# Patient Record
Sex: MALE | Race: WHITE
[De-identification: names, ages, dates, MRNs, and addresses within clinical notes are randomized per-mention and may not be internally consistent; named-entity substitution may affect disease eponyms.]

---

## 2019-09-17 ENCOUNTER — HOSPITAL ENCOUNTER (INPATIENT)
Dept: HOSPITAL 61 - 4 NORTH | Age: 74
LOS: 2 days | Discharge: HOME | DRG: 392 | End: 2019-09-19
Attending: INTERNAL MEDICINE | Admitting: INTERNAL MEDICINE
Payer: MEDICARE

## 2019-09-17 VITALS — HEIGHT: 66 IN | BODY MASS INDEX: 25.71 KG/M2 | WEIGHT: 160 LBS

## 2019-09-17 VITALS — DIASTOLIC BLOOD PRESSURE: 52 MMHG | SYSTOLIC BLOOD PRESSURE: 94 MMHG

## 2019-09-17 VITALS — SYSTOLIC BLOOD PRESSURE: 106 MMHG | DIASTOLIC BLOOD PRESSURE: 64 MMHG

## 2019-09-17 DIAGNOSIS — Z95.810: ICD-10-CM

## 2019-09-17 DIAGNOSIS — Z82.5: ICD-10-CM

## 2019-09-17 DIAGNOSIS — K43.9: ICD-10-CM

## 2019-09-17 DIAGNOSIS — Z80.1: ICD-10-CM

## 2019-09-17 DIAGNOSIS — Z85.828: ICD-10-CM

## 2019-09-17 DIAGNOSIS — N40.0: ICD-10-CM

## 2019-09-17 DIAGNOSIS — I48.91: ICD-10-CM

## 2019-09-17 DIAGNOSIS — D13.7: ICD-10-CM

## 2019-09-17 DIAGNOSIS — I50.22: ICD-10-CM

## 2019-09-17 DIAGNOSIS — I25.5: ICD-10-CM

## 2019-09-17 DIAGNOSIS — I25.10: ICD-10-CM

## 2019-09-17 DIAGNOSIS — M19.90: ICD-10-CM

## 2019-09-17 DIAGNOSIS — E78.5: ICD-10-CM

## 2019-09-17 DIAGNOSIS — Z82.49: ICD-10-CM

## 2019-09-17 DIAGNOSIS — J98.11: ICD-10-CM

## 2019-09-17 DIAGNOSIS — K29.70: Primary | ICD-10-CM

## 2019-09-17 DIAGNOSIS — K21.9: ICD-10-CM

## 2019-09-17 DIAGNOSIS — Z95.5: ICD-10-CM

## 2019-09-17 DIAGNOSIS — D69.6: ICD-10-CM

## 2019-09-17 DIAGNOSIS — Z79.899: ICD-10-CM

## 2019-09-17 DIAGNOSIS — K76.0: ICD-10-CM

## 2019-09-17 DIAGNOSIS — I11.0: ICD-10-CM

## 2019-09-17 DIAGNOSIS — K42.9: ICD-10-CM

## 2019-09-17 LAB
ALBUMIN SERPL-MCNC: 3.7 G/DL (ref 3.4–5)
ALBUMIN/GLOB SERPL: 1.2 {RATIO} (ref 1–1.7)
ALP SERPL-CCNC: 65 U/L (ref 46–116)
ALT SERPL-CCNC: 22 U/L (ref 16–63)
AMYLASE SERPL-CCNC: 50 U/L (ref 25–115)
ANION GAP SERPL CALC-SCNC: 9 MMOL/L (ref 6–14)
AST SERPL-CCNC: 19 U/L (ref 15–37)
BASOPHILS # BLD AUTO: 0 X10^3/UL (ref 0–0.2)
BASOPHILS NFR BLD: 1 % (ref 0–3)
BILIRUB SERPL-MCNC: 0.7 MG/DL (ref 0.2–1)
BUN SERPL-MCNC: 17 MG/DL (ref 8–26)
BUN/CREAT SERPL: 15 (ref 6–20)
CALCIUM SERPL-MCNC: 8.7 MG/DL (ref 8.5–10.1)
CHLORIDE SERPL-SCNC: 108 MMOL/L (ref 98–107)
CO2 SERPL-SCNC: 25 MMOL/L (ref 21–32)
CREAT SERPL-MCNC: 1.1 MG/DL (ref 0.7–1.3)
EOSINOPHIL NFR BLD: 0.1 X10^3/UL (ref 0–0.7)
EOSINOPHIL NFR BLD: 2 % (ref 0–3)
ERYTHROCYTE [DISTWIDTH] IN BLOOD BY AUTOMATED COUNT: 12.4 % (ref 11.5–14.5)
GFR SERPLBLD BASED ON 1.73 SQ M-ARVRAT: 65.4 ML/MIN
GLOBULIN SER-MCNC: 3 G/DL (ref 2.2–3.8)
GLUCOSE SERPL-MCNC: 84 MG/DL (ref 70–99)
HCT VFR BLD CALC: 43.4 % (ref 39–53)
HGB BLD-MCNC: 14.9 G/DL (ref 13–17.5)
LIPASE: 140 U/L (ref 73–393)
LYMPHOCYTES # BLD: 1.1 X10^3/UL (ref 1–4.8)
LYMPHOCYTES NFR BLD AUTO: 23 % (ref 24–48)
MCH RBC QN AUTO: 32 PG (ref 25–35)
MCHC RBC AUTO-ENTMCNC: 34 G/DL (ref 31–37)
MCV RBC AUTO: 94 FL (ref 79–100)
MONO #: 0.5 X10^3/UL (ref 0–1.1)
MONOCYTES NFR BLD: 10 % (ref 0–9)
NEUT #: 3 X10^3/UL (ref 1.8–7.7)
NEUTROPHILS NFR BLD AUTO: 64 % (ref 31–73)
PLATELET # BLD AUTO: 137 X10^3/UL (ref 140–400)
POTASSIUM SERPL-SCNC: 3.9 MMOL/L (ref 3.5–5.1)
PROT SERPL-MCNC: 6.7 G/DL (ref 6.4–8.2)
RBC # BLD AUTO: 4.62 X10^6/UL (ref 4.3–5.7)
SODIUM SERPL-SCNC: 142 MMOL/L (ref 136–145)
WBC # BLD AUTO: 4.8 X10^3/UL (ref 4–11)

## 2019-09-17 PROCEDURE — 85025 COMPLETE CBC W/AUTO DIFF WBC: CPT

## 2019-09-17 PROCEDURE — 76700 US EXAM ABDOM COMPLETE: CPT

## 2019-09-17 PROCEDURE — A9537 TC99M MEBROFENIN: HCPCS

## 2019-09-17 PROCEDURE — 83690 ASSAY OF LIPASE: CPT

## 2019-09-17 PROCEDURE — 78227 HEPATOBIL SYST IMAGE W/DRUG: CPT

## 2019-09-17 PROCEDURE — 93306 TTE W/DOPPLER COMPLETE: CPT

## 2019-09-17 PROCEDURE — 80053 COMPREHEN METABOLIC PANEL: CPT

## 2019-09-17 PROCEDURE — 82150 ASSAY OF AMYLASE: CPT

## 2019-09-17 PROCEDURE — 71046 X-RAY EXAM CHEST 2 VIEWS: CPT

## 2019-09-17 PROCEDURE — 93005 ELECTROCARDIOGRAM TRACING: CPT

## 2019-09-17 PROCEDURE — 36415 COLL VENOUS BLD VENIPUNCTURE: CPT

## 2019-09-17 PROCEDURE — G0378 HOSPITAL OBSERVATION PER HR: HCPCS

## 2019-09-17 PROCEDURE — 80048 BASIC METABOLIC PNL TOTAL CA: CPT

## 2019-09-17 PROCEDURE — 74177 CT ABD & PELVIS W/CONTRAST: CPT

## 2019-09-17 RX ADMIN — ATORVASTATIN CALCIUM SCH MG: 20 TABLET, FILM COATED ORAL at 22:27

## 2019-09-17 RX ADMIN — SACUBITRIL AND VALSARTAN SCH TAB: 49; 51 TABLET, FILM COATED ORAL at 22:27

## 2019-09-17 NOTE — RAD
CT abdomen and pelvis with contrast

 

PQRS statement: CT scans at this facility use dose reduction including 

either automated exposure control, iterative reconstructions, and /or 

weight based radiation dosing via mA and kV modification when appropriate 

to reduce radiation dose to as low as reasonably achievable.

 

HISTORY: Abdominal pain.

 

TECHNIQUE: Helical CT imaging abdomen and pelvis with 75 mL Omnipaque 300 

venous contrast.

 

Abdomen findings: Lung bases unremarkable. Cardiomegaly. Grade 1 L4 

anterolisthesis, lower lumbar disc disease and arthritic changes spinal 

canal and neural foraminal stenoses. At the pancreas uncinate is a 1 cm 

hypervascular lesion. Liver, gallbladder, adrenal glands, spleen and right

kidney are unremarkable. Left renal lower pole parapelvic cysts. There is 

mild fold thickening of the third and fourth segments of the duodenum and 

of the proximal jejunum at the left upper quadrant abdomen. No bowel 

obstruction. Appendix is negative. Sigmoid diverticulosis. 3 center fatty 

umbilical abdominal wall hernia. No abdominal fluid or adenopathy.

 

Pelvis findings: Bladder, prostate, rectum and bones are unremarkable. No 

fluid or adenopathy.

 

IMPRESSION:

1. Mild fold thickening of the duodenum and proximal jejunum likely 

representing enteritis. No bowel obstruction.

2. The appendix is negative.

3. 1 cm hypervascular lesion of the pancreas uncinate. This raises the 

possibility of an islet cell tumor of the pancreas. An aneurysm within the

pancreas would be a secondary consideration.

4. 3 center fatty umbilical abdominal wall hernia.

 

Electronically signed by: Richard Nelson MD (9/17/2019 11:44 PM) 

[id number]

## 2019-09-18 VITALS — DIASTOLIC BLOOD PRESSURE: 69 MMHG | SYSTOLIC BLOOD PRESSURE: 113 MMHG

## 2019-09-18 VITALS — SYSTOLIC BLOOD PRESSURE: 92 MMHG | DIASTOLIC BLOOD PRESSURE: 57 MMHG

## 2019-09-18 VITALS — SYSTOLIC BLOOD PRESSURE: 105 MMHG | DIASTOLIC BLOOD PRESSURE: 68 MMHG

## 2019-09-18 VITALS — DIASTOLIC BLOOD PRESSURE: 61 MMHG | SYSTOLIC BLOOD PRESSURE: 100 MMHG

## 2019-09-18 VITALS — DIASTOLIC BLOOD PRESSURE: 52 MMHG | SYSTOLIC BLOOD PRESSURE: 86 MMHG

## 2019-09-18 VITALS — DIASTOLIC BLOOD PRESSURE: 66 MMHG | SYSTOLIC BLOOD PRESSURE: 109 MMHG

## 2019-09-18 LAB
ANION GAP SERPL CALC-SCNC: 9 MMOL/L (ref 6–14)
BASOPHILS # BLD AUTO: 0 X10^3/UL (ref 0–0.2)
BASOPHILS NFR BLD: 1 % (ref 0–3)
BUN SERPL-MCNC: 14 MG/DL (ref 8–26)
CALCIUM SERPL-MCNC: 8.7 MG/DL (ref 8.5–10.1)
CHLORIDE SERPL-SCNC: 109 MMOL/L (ref 98–107)
CO2 SERPL-SCNC: 25 MMOL/L (ref 21–32)
CREAT SERPL-MCNC: 1.1 MG/DL (ref 0.7–1.3)
EOSINOPHIL NFR BLD: 0.1 X10^3/UL (ref 0–0.7)
EOSINOPHIL NFR BLD: 2 % (ref 0–3)
ERYTHROCYTE [DISTWIDTH] IN BLOOD BY AUTOMATED COUNT: 12.7 % (ref 11.5–14.5)
GFR SERPLBLD BASED ON 1.73 SQ M-ARVRAT: 65.4 ML/MIN
GLUCOSE SERPL-MCNC: 95 MG/DL (ref 70–99)
HCT VFR BLD CALC: 42.4 % (ref 39–53)
HGB BLD-MCNC: 14.4 G/DL (ref 13–17.5)
LYMPHOCYTES # BLD: 0.9 X10^3/UL (ref 1–4.8)
LYMPHOCYTES NFR BLD AUTO: 20 % (ref 24–48)
MCH RBC QN AUTO: 32 PG (ref 25–35)
MCHC RBC AUTO-ENTMCNC: 34 G/DL (ref 31–37)
MCV RBC AUTO: 94 FL (ref 79–100)
MONO #: 0.5 X10^3/UL (ref 0–1.1)
MONOCYTES NFR BLD: 11 % (ref 0–9)
NEUT #: 2.8 X10^3/UL (ref 1.8–7.7)
NEUTROPHILS NFR BLD AUTO: 67 % (ref 31–73)
PLATELET # BLD AUTO: 138 X10^3/UL (ref 140–400)
POTASSIUM SERPL-SCNC: 3.9 MMOL/L (ref 3.5–5.1)
RBC # BLD AUTO: 4.49 X10^6/UL (ref 4.3–5.7)
SODIUM SERPL-SCNC: 143 MMOL/L (ref 136–145)
WBC # BLD AUTO: 4.2 X10^3/UL (ref 4–11)

## 2019-09-18 PROCEDURE — 4B02XSZ MEASUREMENT OF CARDIAC PACEMAKER, EXTERNAL APPROACH: ICD-10-PCS | Performed by: PHYSICIAN ASSISTANT

## 2019-09-18 RX ADMIN — TAMSULOSIN HYDROCHLORIDE SCH MG: 0.4 CAPSULE ORAL at 08:10

## 2019-09-18 RX ADMIN — ASPIRIN 81 MG SCH MG: 81 TABLET ORAL at 08:09

## 2019-09-18 RX ADMIN — METOPROLOL SUCCINATE SCH MG: 25 TABLET, EXTENDED RELEASE ORAL at 08:11

## 2019-09-18 RX ADMIN — SACUBITRIL AND VALSARTAN SCH TAB: 49; 51 TABLET, FILM COATED ORAL at 10:01

## 2019-09-18 RX ADMIN — PANTOPRAZOLE SODIUM SCH MG: 40 TABLET, DELAYED RELEASE ORAL at 08:08

## 2019-09-18 RX ADMIN — SACUBITRIL AND VALSARTAN SCH TAB: 49; 51 TABLET, FILM COATED ORAL at 21:17

## 2019-09-18 RX ADMIN — ATORVASTATIN CALCIUM SCH MG: 20 TABLET, FILM COATED ORAL at 21:17

## 2019-09-18 RX ADMIN — SPIRONOLACTONE SCH MG: 25 TABLET ORAL at 09:00

## 2019-09-18 NOTE — RAD
CHEST PA   LATERAL

 

History: Abdominal pain.

 

Comparison: October 30, 2019.

 

Findings:

Left transvenous pacemaker/ICD, unchanged. Left basilar subsegmental 

atelectasis. Unchanged heart size. No focal consolidation. No pleural 

effusion.

 

Impression: 

1.  Left basilar subsegmental atelectasis.

 

Electronically signed by: Negro Wolf DO (9/18/2019 9:59 AM) [id number]

## 2019-09-18 NOTE — PDOC2
CONSULT


Date of Consult


Date of Consult


DATE: 9/18/19 


TIME: 10:37





History of Present Illness


Reason for Visit:


The patient is a 74 year old male who was admitted after seeing his PCP, Dr Nova.  The patient has been experiencing abdominal pain over the last 3 

weeks.  The pain can migrate moving from the right to the mid abdomen.  The pain

is at times achy and dull, but can intensify.  He denies nausea or vomiting, but

does report some limited diarrhea.





Past Medical History


Past Medical History


enlarged prostate, MI, CAD, cardiomyopathy, hypertension





Past Surgical History


Past Surgical History


pacemaker, shoulder surgery, excision of skin lesion from shoulder





Social History


No


ALCOHOL:  occassional


Drugs:  None





Current Medications


Current Medications





Current Medications


Potassium Chloride/Dextrose/ Sod Cl 1,000 ml @  60 mls/hr [id number] IV  Last 

administered on 9/17/19at 19:47;  Start 9/17/19 at 18:15;  Stop 9/18/19 at 

10:28;  Status DC


Morphine Sulfate (Morphine Sulfate) 1 mg PRN Q4HRS  PRN IV PAIN;  Start 9/17/19 

at 18:15


Acetaminophen (Tylenol) 325 mg PRN Q6HRS  PRN PO MILD PAIN / TEMP;  Start 

9/17/19 at 18:15


Metronidazole 100 ml @  100 mls/hr Q8HRS IV  Last administered on 9/18/19at 

05:50;  Start 9/17/19 at 22:00;  Stop 9/18/19 at 07:19;  Status DC


Levofloxacin/ Dextrose 100 ml @  100 mls/hr Q24H IV  Last administered on 

9/17/19at 19:47;  Start 9/17/19 at 19:00;  Stop 9/18/19 at 07:19;  Status DC


Aspirin (Children'S Aspirin) 81 mg DAILY PO  Last administered on 9/18/19at 

08:15;  Start 9/18/19 at 09:00


Atorvastatin Calcium (Lipitor) 20 mg QHS PO  Last administered on 9/17/19at 

22:27;  Start 9/17/19 at 21:00


Metoprolol Succinate (Toprol Xl) 25 mg DAILY PO  Last administered on 9/18/19at 

08:15;  Start 9/18/19 at 09:00


Pantoprazole Sodium (Protonix) 40 mg DAILYAC PO  Last administered on 9/18/19at 

08:15;  Start 9/18/19 at 07:30


Sacubitril/ Valsartan (Entresto 49 Mg-51 Mg) 1 tab BID PO  Last administered on 

9/18/19at 10:01;  Start 9/17/19 at 21:00


Tamsulosin HCl (Flomax) 0.4 mg DAILY PO  Last administered on 9/18/19at 08:15;  

Start 9/18/19 at 09:00


Spironolactone (Aldactone) 25 mg DAILY PO ;  Start 9/18/19 at 09:00


Iohexol (Omnipaque 300 Mg/ml) 75 ml 1X  ONCE IV ;  Start 9/17/19 at 23:30;  Stop

9/17/19 at 23:31;  Status DC


Info (CONTRAST GIVEN -- Rx MONITORING) 1 each PRN DAILY  PRN [facility] SEE COMMENTS;  

Start 9/17/19 at 23:15;  Stop 9/19/19 at 23:14


Acetaminophen/ Hydrocodone Bitart (Lortab 5/325) 1 tab PRN Q4HRS  PRN PO 

MODERATE PAIN;  Start 9/18/19 at 10:30





Active Scripts


Active


Reported


Spironolactone 25 Mg Tablet 1 Tab PO DAILY


Entresto 49 mg-51 mg Tablet (Sacubitril/Valsartan) 1 Each Tablet 1 Each PO BID


NITROGLYCERIN SubLingual (Nitroglycerin) 0.4 Mg Tab.subl 1 Tab SL UD


Pantoprazole Sodium  ** (Pantoprazole Sodium) 40 Mg Tablet.dr 40 Mg PO DAILY


Toprol Xl (Metoprolol Succinate) 25 Mg Tab.er.24h 25 Mg PO DAILY


Flomax (Tamsulosin Hcl) 0.4 Mg Cap.er.24h 0.4 Mg PO DAILY


Clopidogrel (Clopidogrel Bisulfate) 75 Mg Tablet 75 Mg PO DAILY


Aspirin 81 Mg Tab.chew 81 Mg PO DAILY


Atorvastatin Calcium 40 Mg Tablet 40 Mg PO HS


Losartan Potassium ** (Losartan Potassium) 25 Mg Tablet 25 Mg PO DAILY





Allergies


Allergies:  


Coded Allergies:  


     ramipril (Verified  Allergy, Intermediate, 9/18/19)


   


   cough


     thimerosal (Verified  Allergy, Intermediate, 8/17/15)


   


   reddened eyes





ROS


General:  No: Chills, Night Sweats, Fatigue, Malaise, Appetite, Other


PSYCHOLOGICAL ROS:  No: Anxiety, Behavioral Disorder, Concentration difficultie,

Decreased libido, Depression, Disorientation, Hallucinations, Hostility, 

Irritablity, Memory difficulties, Mood Swings, Obsessive thoughts, Physical 

abuse, Sexual abuse, Sleep disturbances, Suicidal ideation, Other


Eyes:  No Blurry vision, No Decreased vision, No Double vision, No Dry eyes, No 

Excessive tearing, No Eye Pain, No Itchy Eyes, No Loss of vision, No 

Photophobia, No Scotomata, No Uses contacts, No Uses glasses, No Other


HEENT:  No: Heacaches, Visual Changes, Hearing change, Nasal congestion, Nasal 

discharge, Oral lesions, Sinus pain, Sore Throat, Epistaxis, Sneezing, Snoring, 

Tinnitus, Vertigo, Vocal changes, Other


ALLERGY AND IMMUNOLOGY:  No: Hives, Insect Bite Sensitivity, Itchy/Watery Eyes, 

Nasal Congestion, Post Nasal Drip, Seasonal Allergies, Other


Hematological and Lymphatic:  No: Bleeding Problems, Blood Clots, Blood 

Transfusions, Brusing, Night Sweats, Pallor, Swollen Lymph Nodes, Other


ENDOCRINE:  No: Breast Changes, Galactorrhea, Hair Pattern Changes, Hot Flashes,

Malaise/lethargy, Mood Swings, Palpitations, Polydipsia/polyuria, Skin Changes, 

Temperature Intolerance, Unexpected Weight Changes, Other


Cardiovascular:  No Chest Pain, No Palpitations, No Orthopnea, No Paroxysmal 

Noc. Dyspnea, No Edema, No Lt Headedness, No Other


Gastrointestinal:  Yes Abdominal Pain


Genitourinary:  No Dysuria, No Frequency, No Incontinence, No Hematuria, No 

Retention, No Discharge, No Urgency, No Pain, No Flank Pain, No Other, No , No ,

No , No , No , No , No 


Musculoskeletal:  No Gait Disturbance, No Joint Pain, No Joint Stiffness, No 

Joint Swelling, No Muscle Pain, No Muscular Weakness, No Pain In:, No Swelling 

In:, No Other


Neurological:  No Behavorial Changes, No Bowel/Bladder ControlChng, No 

Confusion, No Dizziness, No Gait Disturbance, No Headaches, No Impaired 

Coord/balance, No Memory Loss, No Numbness/Tingling, No Seizures, No Speech 

Problems, No Tremors, No Visual Changes, No Weakness, No Other


Skin:  No Dry Skin, No Eczema, No Hair Changes, No Lumps, No Mole Changes, No 

Mottling, No Nail Changes, No Pruritus, No Rash, No Skin Lesion Changes, No 

Other, No Acne





Physical Exam


General:  Alert, Oriented X3, Cooperative


HEENT:  Atraumatic


Lungs:  Clear to auscultation


Heart:  Regular rate


Abdomen:  Soft


Extremities:  No clubbing (mildly tender just right of midline in upper abdomen,

small reducible umbilical hernia)


Skin:  No rashes, No breakdown


Neuro:  Normal speech


Psych/Mental Status:  Mental status NL


MUSCULOSKELETAL:  No joint tenderness, No deformity





Vitals


VITALS





Vital Signs








  Date Time  Temp Pulse Resp B/P (MAP) Pulse Ox O2 Delivery O2 Flow Rate FiO2


 


9/18/19 10:01  74  100/61    


 


9/18/19 07:00 99.0  14  98 Room Air  





 99.0       











Labs


Labs





Laboratory Tests








Test


 9/17/19


19:15 9/18/19


05:40


 


White Blood Count


 4.8 x10^3/uL


(4.0-11.0) 4.2 x10^3/uL


(4.0-11.0)


 


Red Blood Count


 4.62 x10^6/uL


(4.30-5.70) 4.49 x10^6/uL


(4.30-5.70)


 


Hemoglobin


 14.9 g/dL


(13.0-17.5) 14.4 g/dL


(13.0-17.5)


 


Hematocrit


 43.4 %


(39.0-53.0) 42.4 %


(39.0-53.0)


 


Mean Corpuscular Volume 94 fL ()  94 fL () 


 


Mean Corpuscular Hemoglobin 32 pg (25-35)  32 pg (25-35) 


 


Mean Corpuscular Hemoglobin


Concent 34 g/dL


(31-37) 34 g/dL


(31-37)


 


Red Cell Distribution Width


 12.4 %


(11.5-14.5) 12.7 %


(11.5-14.5)


 


Platelet Count


 137 x10^3/uL


(140-400) 138 x10^3/uL


(140-400)


 


Neutrophils (%) (Auto) 64 % (31-73)  67 % (31-73) 


 


Lymphocytes (%) (Auto) 23 % (24-48)  20 % (24-48) 


 


Monocytes (%) (Auto) 10 % (0-9)  11 % (0-9) 


 


Eosinophils (%) (Auto) 2 % (0-3)  2 % (0-3) 


 


Basophils (%) (Auto) 1 % (0-3)  1 % (0-3) 


 


Neutrophils # (Auto)


 3.0 x10^3/uL


(1.8-7.7) 2.8 x10^3/uL


(1.8-7.7)


 


Lymphocytes # (Auto)


 1.1 x10^3/uL


(1.0-4.8) 0.9 x10^3/uL


(1.0-4.8)


 


Monocytes # (Auto)


 0.5 x10^3/uL


(0.0-1.1) 0.5 x10^3/uL


(0.0-1.1)


 


Eosinophils # (Auto)


 0.1 x10^3/uL


(0.0-0.7) 0.1 x10^3/uL


(0.0-0.7)


 


Basophils # (Auto)


 0.0 x10^3/uL


(0.0-0.2) 0.0 x10^3/uL


(0.0-0.2)


 


Sodium Level


 142 mmol/L


(136-145) 143 mmol/L


(136-145)


 


Potassium Level


 3.9 mmol/L


(3.5-5.1) 3.9 mmol/L


(3.5-5.1)


 


Chloride Level


 108 mmol/L


() 109 mmol/L


()


 


Carbon Dioxide Level


 25 mmol/L


(21-32) 25 mmol/L


(21-32)


 


Anion Gap 9 (6-14)  9 (6-14) 


 


Blood Urea Nitrogen


 17 mg/dL


(8-26) 14 mg/dL


(8-26)


 


Creatinine


 1.1 mg/dL


(0.7-1.3) 1.1 mg/dL


(0.7-1.3)


 


Estimated GFR


(Cockcroft-Gault) 65.4 


 65.4 





 


BUN/Creatinine Ratio 15 (6-20)  


 


Glucose Level


 84 mg/dL


(70-99) 95 mg/dL


(70-99)


 


Calcium Level


 8.7 mg/dL


(8.5-10.1) 8.7 mg/dL


(8.5-10.1)


 


Total Bilirubin


 0.7 mg/dL


(0.2-1.0) 





 


Aspartate Amino Transf


(AST/SGOT) 19 U/L (15-37) 


 





 


Alanine Aminotransferase


(ALT/SGPT) 22 U/L (16-63) 


 





 


Alkaline Phosphatase


 65 U/L


() 





 


Total Protein


 6.7 g/dL


(6.4-8.2) 





 


Albumin


 3.7 g/dL


(3.4-5.0) 





 


Albumin/Globulin Ratio 1.2 (1.0-1.7)  


 


Amylase Level


 50 U/L


() 





 


Lipase


 140 U/L


() 











Laboratory Tests








Test


 9/17/19


19:15 9/18/19


05:40


 


White Blood Count


 4.8 x10^3/uL


(4.0-11.0) 4.2 x10^3/uL


(4.0-11.0)


 


Red Blood Count


 4.62 x10^6/uL


(4.30-5.70) 4.49 x10^6/uL


(4.30-5.70)


 


Hemoglobin


 14.9 g/dL


(13.0-17.5) 14.4 g/dL


(13.0-17.5)


 


Hematocrit


 43.4 %


(39.0-53.0) 42.4 %


(39.0-53.0)


 


Mean Corpuscular Volume 94 fL ()  94 fL () 


 


Mean Corpuscular Hemoglobin 32 pg (25-35)  32 pg (25-35) 


 


Mean Corpuscular Hemoglobin


Concent 34 g/dL


(31-37) 34 g/dL


(31-37)


 


Red Cell Distribution Width


 12.4 %


(11.5-14.5) 12.7 %


(11.5-14.5)


 


Platelet Count


 137 x10^3/uL


(140-400) 138 x10^3/uL


(140-400)


 


Neutrophils (%) (Auto) 64 % (31-73)  67 % (31-73) 


 


Lymphocytes (%) (Auto) 23 % (24-48)  20 % (24-48) 


 


Monocytes (%) (Auto) 10 % (0-9)  11 % (0-9) 


 


Eosinophils (%) (Auto) 2 % (0-3)  2 % (0-3) 


 


Basophils (%) (Auto) 1 % (0-3)  1 % (0-3) 


 


Neutrophils # (Auto)


 3.0 x10^3/uL


(1.8-7.7) 2.8 x10^3/uL


(1.8-7.7)


 


Lymphocytes # (Auto)


 1.1 x10^3/uL


(1.0-4.8) 0.9 x10^3/uL


(1.0-4.8)


 


Monocytes # (Auto)


 0.5 x10^3/uL


(0.0-1.1) 0.5 x10^3/uL


(0.0-1.1)


 


Eosinophils # (Auto)


 0.1 x10^3/uL


(0.0-0.7) 0.1 x10^3/uL


(0.0-0.7)


 


Basophils # (Auto)


 0.0 x10^3/uL


(0.0-0.2) 0.0 x10^3/uL


(0.0-0.2)


 


Sodium Level


 142 mmol/L


(136-145) 143 mmol/L


(136-145)


 


Potassium Level


 3.9 mmol/L


(3.5-5.1) 3.9 mmol/L


(3.5-5.1)


 


Chloride Level


 108 mmol/L


() 109 mmol/L


()


 


Carbon Dioxide Level


 25 mmol/L


(21-32) 25 mmol/L


(21-32)


 


Anion Gap 9 (6-14)  9 (6-14) 


 


Blood Urea Nitrogen


 17 mg/dL


(8-26) 14 mg/dL


(8-26)


 


Creatinine


 1.1 mg/dL


(0.7-1.3) 1.1 mg/dL


(0.7-1.3)


 


Estimated GFR


(Cockcroft-Gault) 65.4 


 65.4 





 


BUN/Creatinine Ratio 15 (6-20)  


 


Glucose Level


 84 mg/dL


(70-99) 95 mg/dL


(70-99)


 


Calcium Level


 8.7 mg/dL


(8.5-10.1) 8.7 mg/dL


(8.5-10.1)


 


Total Bilirubin


 0.7 mg/dL


(0.2-1.0) 





 


Aspartate Amino Transf


(AST/SGOT) 19 U/L (15-37) 


 





 


Alanine Aminotransferase


(ALT/SGPT) 22 U/L (16-63) 


 





 


Alkaline Phosphatase


 65 U/L


() 





 


Total Protein


 6.7 g/dL


(6.4-8.2) 





 


Albumin


 3.7 g/dL


(3.4-5.0) 





 


Albumin/Globulin Ratio 1.2 (1.0-1.7)  


 


Amylase Level


 50 U/L


() 





 


Lipase


 140 U/L


() 














Images


Images


CT abdomen:





IMPRESSION:


1. Mild fold thickening of the duodenum and proximal jejunum likely 


representing enteritis. No bowel obstruction.


2. The appendix is negative.


3. 1 cm hypervascular lesion of the pancreas uncinate. This raises the 


possibility of an islet cell tumor of the pancreas. An aneurysm within the


pancreas would be a secondary consideration.


4. 3 center fatty umbilical abdominal wall hernia.





Assessment/Plan


Assessment/Plan


74 year old male with abdominal pain, CT findings noted;  suspect enteritis,  

small 1 cm pancreatic mass likely incidental.  No immediate surgical 

indications,  GI consulted and await their evaluation.  Consider a PIPIDA scan.











PARKER SUAREZ MD             Sep 18, 2019 10:42

## 2019-09-18 NOTE — PDOC2
NATE HONG APRN 9/18/19 1052:


CARDIAC CONSULT


DATE OF CONSULT


Date of Consult


DATE: 9/18/19 


TIME: 10:50





REASON FOR CONSULT


Reason for Consult:


hx of cardiomyopathy





REFERRING PHYSICIAN


Referring Physician:


Avtar





SOURCE


Source:  Chart review, Patient





HISTORY OF PRESENT ILLNESS


HISTORY OF PRESENT ILLNESS


This is a pleasant 75 yo male admitted for complains of abdominal pain.  Reports

that this just started this week.  His abd is tender especially when pressed. 

Reports no nause or vomiting and actually had a BM yesterday that was formed.  

Denies any chest pain, SOA.  Consult is for cardiomyopathy which is not new for 

him but his EF is now down from 35% to 20s.  No PND, orthopnea, palpitations, fr

equent dizziness, or passing out. No leg swelling.  He weighs himself everyday 

till his scale broke last week.  He follows his diet restriction and FR. His 

activity tolerance has not chnaged and no JADE nor exertional CP





PAST MEDICAL HISTORY


Cardiovascular:  AFIB, CAD, CHF, HTN, Hyperlipidemia, Other (Cardiomyopathy)


Pulmonary:  No pertinent hx


CENTRAL NERVOUS SYSTEM:  Other (no pertinent history)


GI:  GERD


Heme/Onc:  No pertinent hx


Hepatobiliary:  No pertinent hx


Psych:  No pertinent hx


Musculoskeletal:  Osteoarthritis


Rheumatologic:  No pertinent hx


Infectious disease:  No pertinent hx


ENT:  Other (cataract)


Renal/:  Benign prostatic enlarg.


Endocrine:  No pertinent hx


Dermatology:  Other (skin CA to back with removal)





PAST SURGICAL HISTORY


Past Surgical History:  Pacemaker (AICD), Arthroscopy (right shoulder), Other 

(PCI/stents remotely)





FAMILY HISTORY


Family History:  Coronary Artery Disease (father and brother)





SOCIAL HISTORY


Smoke:  Quit (remotely)


ALCOHOL:  none


Drugs:  None


Lives:  with Family





CURRENT MEDICATIONS


CURRENT MEDICATIONS





Current Medications








 Medications


  (Trade)  Dose


 Ordered  Sig/Clovis


 Route


 PRN Reason  Start Time


 Stop Time Status Last Admin


Dose Admin


 


 Potassium


 Chloride/Dextrose/


 Sod Cl  1,000 ml @ 


 60 mls/hr  [id number]


 IV


   9/17/19 18:15


 9/18/19 10:28 DC 9/17/19 19:47





 


 Metronidazole  100 ml @ 


 100 mls/hr  Q8HRS


 IV


   9/17/19 22:00


 9/18/19 07:19 DC 9/18/19 05:50





 


 Levofloxacin/


 Dextrose  100 ml @ 


 100 mls/hr  Q24H


 IV


   9/17/19 19:00


 9/18/19 07:19 DC 9/17/19 19:47





 


 Aspirin


  ([facility]


 Aspirin)  81 mg  DAILY


 PO


   9/18/19 09:00


    9/18/19 08:15





 


 Atorvastatin


 Calcium


  (Lipitor)  20 mg  QHS


 PO


   9/17/19 21:00


    9/17/19 22:27





 


 Metoprolol


 Succinate


  (Toprol Xl)  25 mg  DAILY


 PO


   9/18/19 09:00


    9/18/19 08:15





 


 Pantoprazole


 Sodium


  (Protonix)  40 mg  DAILYAC


 PO


   9/18/19 07:30


    9/18/19 08:15





 


 Sacubitril/


 Valsartan


  (Entresto 49


 Mg-51 Mg)  1 tab  BID


 PO


   9/17/19 21:00


    9/18/19 10:01





 


 Tamsulosin HCl


  (Flomax)  0.4 mg  DAILY


 PO


   9/18/19 09:00


    9/18/19 08:15














ALLERGIES


ALLERGIES:  


Coded Allergies:  


     ramipril (Verified  Allergy, Intermediate, 9/18/19)


   


   cough


     thimerosal (Verified  Allergy, Intermediate, 8/17/15)


   


   reddened eyes





ROS


Review of System


14 point ROS evaluated with pertinent positives noted per HPI





PHYSICAL EXAM


General:  Alert, Oriented X3, Cooperative, No acute distress


HEENT:  Atraumatic, Mucous membr. moist/pink


Lungs:  Other (diminished bases)


Heart:  Regular rate, Normal S1, Normal S2


Abdomen:  Other (distended with diffuse tenderness)


Extremities:  No cyanosis, No edema


Skin:  No breakdown, No significant lesion


Neuro:  Normal speech, Sensation intact


Psych/Mental Status:  Mental status NL, Mood NL


MUSCULOSKELETAL:  Osteoarthritic changes both hands





VITALS/I&O


VITALS/I&O:





                                   Vital Signs








  Date Time  Temp Pulse Resp B/P (MAP) Pulse Ox O2 Delivery O2 Flow Rate FiO2


 


9/18/19 10:01  74  100/61    


 


9/18/19 07:00 99.0  14  98 Room Air  





 99.0       











LABS


Lab:





                                Laboratory Tests








Test


 9/17/19


19:15 9/18/19


05:40


 


White Blood Count


 4.8 x10^3/uL


(4.0-11.0) 4.2 x10^3/uL


(4.0-11.0)


 


Red Blood Count


 4.62 x10^6/uL


(4.30-5.70) 4.49 x10^6/uL


(4.30-5.70)


 


Hemoglobin


 14.9 g/dL


(13.0-17.5) 14.4 g/dL


(13.0-17.5)


 


Hematocrit


 43.4 %


(39.0-53.0) 42.4 %


(39.0-53.0)


 


Mean Corpuscular Volume


 94 fL ()


 94 fL ()





 


Mean Corpuscular Hemoglobin 32 pg (25-35)   32 pg (25-35)  


 


Mean Corpuscular Hemoglobin


Concent 34 g/dL


(31-37) 34 g/dL


(31-37)


 


Red Cell Distribution Width


 12.4 %


(11.5-14.5) 12.7 %


(11.5-14.5)


 


Platelet Count


 137 x10^3/uL


(140-400)  L 138 x10^3/uL


(140-400)  L


 


Neutrophils (%) (Auto) 64 % (31-73)   67 % (31-73)  


 


Lymphocytes (%) (Auto) 23 % (24-48)  L 20 % (24-48)  L


 


Monocytes (%) (Auto) 10 % (0-9)  H 11 % (0-9)  H


 


Eosinophils (%) (Auto) 2 % (0-3)   2 % (0-3)  


 


Basophils (%) (Auto) 1 % (0-3)   1 % (0-3)  


 


Neutrophils # (Auto)


 3.0 x10^3/uL


(1.8-7.7) 2.8 x10^3/uL


(1.8-7.7)


 


Lymphocytes # (Auto)


 1.1 x10^3/uL


(1.0-4.8) 0.9 x10^3/uL


(1.0-4.8)  L


 


Monocytes # (Auto)


 0.5 x10^3/uL


(0.0-1.1) 0.5 x10^3/uL


(0.0-1.1)


 


Eosinophils # (Auto)


 0.1 x10^3/uL


(0.0-0.7) 0.1 x10^3/uL


(0.0-0.7)


 


Basophils # (Auto)


 0.0 x10^3/uL


(0.0-0.2) 0.0 x10^3/uL


(0.0-0.2)


 


Sodium Level


 142 mmol/L


(136-145) 143 mmol/L


(136-145)


 


Potassium Level


 3.9 mmol/L


(3.5-5.1) 3.9 mmol/L


(3.5-5.1)


 


Chloride Level


 108 mmol/L


()  H 109 mmol/L


()  H


 


Carbon Dioxide Level


 25 mmol/L


(21-32) 25 mmol/L


(21-32)


 


Anion Gap 9 (6-14)   9 (6-14)  


 


Blood Urea Nitrogen


 17 mg/dL


(8-26) 14 mg/dL


(8-26)


 


Creatinine


 1.1 mg/dL


(0.7-1.3) 1.1 mg/dL


(0.7-1.3)


 


Estimated GFR


(Cockcroft-Gault) 65.4  


 65.4  





 


BUN/Creatinine Ratio 15 (6-20)   


 


Glucose Level


 84 mg/dL


(70-99) 95 mg/dL


(70-99)


 


Calcium Level


 8.7 mg/dL


(8.5-10.1) 8.7 mg/dL


(8.5-10.1)


 


Total Bilirubin


 0.7 mg/dL


(0.2-1.0) 





 


Aspartate Amino Transferase


(AST) 19 U/L (15-37)


 





 


Alanine Aminotransferase (ALT)


 22 U/L (16-63)


 





 


Alkaline Phosphatase


 65 U/L


() 





 


Total Protein


 6.7 g/dL


(6.4-8.2) 





 


Albumin


 3.7 g/dL


(3.4-5.0) 





 


Albumin/Globulin Ratio 1.2 (1.0-1.7)   


 


Amylase Level


 50 U/L


() 





 


Lipase


 140 U/L


() 








                                Laboratory Tests


9/17/19 19:15








9/18/19 05:40








                                Laboratory Tests


9/17/19 19:15








9/18/19 05:40











IMAGES


IMAGES


IMPRESSION CT from [facility]





1. No bowel obstruction, ascites, or acute inflammatory process. The 


appendix is normal.


2. Cholelithiasis without gallbladder wall thickening or pericholecystic 


fluid. 


3. Mild diverticulosis coli.





By my electronic signature, I attest that I have personally reviewed the 


images for this examination and formulated the interpretations and 


opinions expressed in this report


1/2/2019 4:16 AM.





ECHOCARDIOGRAM


ECHOCARDIOGRAM





<Conclusion>


Left ventricle systolic function is severely impaired.


The Ejection Fraction is 20-25%.


Transmitral Doppler flow pattern is Grade I-abnormal relaxation pattern.


There is a pacemaker/ICD lead in the right atrium and right ventricle.


Trace mitral regurgitation.


Mild tricuspid regurgitation.


The PA pressure was estimated at 35 mmHg.


There is no evidence of significant pericardial effusion.





DATE:     09/18/19 0933





3/20/2017 from 











Left Ventricle Normal size and wall thickness. Severe LV Systolic Dysfunction 

with LVEF ~=25%. The anteroseptal and apical segments are akinetic with global 

hypokinesis otherwise. No LV mass or thrombus visualized. Probably normal LV 

Diastolic function. Reduced septal and lateral e' velocities noted, but other 

parameters are not clearly diagnostic of LV diastolic dysfunction.














Right Ventricle Normal size and wall thickness. Preserved RV systolic function. 

Pacemaker lead present in the ventricle.














Left Atrium Normal size.














Right Atrium Normal size.














IVC/SVC Normal central venous pressure (0-5 mm Hg).














Mitral Valve Normal valve structure. No stenosis. Trace regurgitation.














Tricuspid Valve Normal valve structure. No stenosis. Trace regurgitation.














Aortic Valve Normal valve structure. No stenosis. No regurgitation.














Pulmonary Normal valve structure. No stenosis. Trace regurgitation. 


The pulmonary artery was not well seen.














Aorta Normal aortic root and ascending aorta size. No Doppler evidence of aortic

coarctation














Pericardium No pericardial effusion.











STRESS TEST


STRESS TEST


3/20/2017 from   SUMMARY/OPINION: This study is abnormal with evidence of a 

prior anterior infarction with limited border zone reversibility. This infarct 

extends from the mid anteroseptal area and involves the distal anterolateral 

region, the apex and the inferior apex as well. Left ventricular systolic 

function is abnormal with an EF of 37%. There are no high risk prognostic 

indicators present. The ECG portion of the study is negative for ischemia but 

shows an old anterior MI. There are some mild reversible inferior changes as 

well that may represent limited ishemia. 


Comparison is made with a prior study completed 2013. Ejection fraction was 32%.

There are no significant changes. A similar perfusion pattern was seen at that 

time. 


In aggregate the current study is intermediate risk in regards to predicted 

annual cardiovascular mortality rate.





ASSESSMENT/PLAN


ASSESSMENT/PLAN


1. Abd pain: possible enteritis. pipida pending for today.  1/2019 CT [facility] noted 

with cholelithiasis


2. Ischemic cardiomyopathy: EF 25% unchanged by comparison


3. Chronic systolic CHF: NYHA 1-2, compensated


4. CAD: past stents, clinically stable. 


5. HTN: controlled


6. HLP


7. AICD in situ: medtronic





Recommendations


1. Continue with secondary prevention including entresto. 


2. Records reviewed above. Will interrogate device. Obtain baseline EKG. 


3. Cardiac wise he is stable, and will defer any further imaging or stress test 

to his cardiologist Dr. Lama. 


4. Lasix PRN. daily wt Strict I & O 2L FR





IAIN KAMINSKI MD 9/19/19 0750:


CARDIAC CONSULT


ASSESSMENT/PLAN


ASSESSMENT/PLAN


Patient seen and examined 9/18/19.  Agree with NP's assessment and plan.


2-D echo showed LVEF 20-25%


Chronic systolic heart failure clinically well compensated


CAD status stable


ICD interrogation normal


Continue workup for abdominal pain per GI team


Follow-up with primary cardiologist upon discharge


Thank you for your consultation











NATE HONG          Sep 18, 2019 10:52


IAIN KAMINSKI MD           Sep 19, 2019 07:50

## 2019-09-18 NOTE — EKG
[facility]

              [address] [place] [address]

Test Date:    2019               Test Time:    10:59:53

Pat Name:     JENNIFER ISRAEL            Department:   

Patient ID:   [id number]           Room:         414 

Gender:       M                        Technician:   

:          1945               Requested By: COTY FIORE

Order Number: [id number]           Reading MD:     

                                 Measurements

Intervals                              Axis          

Rate:         60                       P:            0

VA:           176                      QRS:          -44

QRSD:         92                       T:            -175

QT:           374                                    

QTc:          378                                    

                           Interpretive Statements

SINUS RHYTHM

ABNORMAL LEFT AXIS DEVIATION

R-S TRANSITION ZONE IN V LEADS DISPLACED TO THE LEFT

LEFT ANTERIOR FASCICULAR BLOCK

INCOMPLETE RIGHT BUNDLE BRANCH BLOCK

ABNORMAL ECG

RI6.01          Unconfirmed report

No previous ECG available for comparison

## 2019-09-18 NOTE — CARD
MR#: [id number]

Account#: [id number]

Accession#: [id number]

Date of Study: 09/18/2019

Ordering Physician: COTY FIORE, 

Referring Physician: COTY FIORE 

Tech: Mariia Felton RDCS





--------------- APPROVED REPORT --------------





EXAM: Two-dimensional and M-mode echocardiogram with Doppler and color Doppler.



Other Information 

Quality : Good



INDICATION

LV Function:Systolic



Surgery/Intervention

ICD/Pacemaker: 



2D DIMENSIONS 

RVDd2.6 (2.9-3.5cm)Left Atrium(2D)4.1 (1.6-4.0cm)

IVSd0.9 (0.7-1.1cm)Aortic Root(2D)3.0 (2.0-3.7cm)

LVDd4.8 (3.9-5.9cm)LVOT Diameter2.2 (1.8-2.4cm)

PWd0.9 (0.7-1.1cm)LVDs4.3 (2.5-4.0cm)

FS (%) 10.0 %SV79.7 ml

LVEF(%)20.0 (>50%)



Aortic Valve

AoV Peak Ronen.87.5cm/sAoV VTI17.4cm

AO Peak GR.3.1mmHgLVOT Peak Ronen.71.5cm/s

AO Mean GR.2mmHgAVA (VMAX)3.23cm2

DAHLIA   (VTI)3.10cm2



Mitral Valve

MV E Jhfzyzjn00.9cm/sMV DECEL PAVL258qh

MV A Rvkbrika683.6cm/sE/A  Ratio0.5



Tricuspid Valve

TR P. Yploewyp568ef/sRAP ZFZCWLKI6ccDo

TR Peak Gr.85ceSdTHWH18wkJc



Pulmonary Vein

S1 Yidrkexv81.6cm/sD2 Okbudycb16.1cm/s



 LEFT VENTRICLE 

The left ventricle is normal size. There is normal left ventricular wall thickness. Left ventricle sy
stolic function is severely impaired. The Ejection Fraction is 20-25%. Apical wall motion consistent 
with pacemaker activation. Transmitral Doppler flow pattern is Grade I-abnormal relaxation pattern.



 RIGHT VENTRICLE 

The right ventricle is normal size. The right ventricular systolic function is normal. There is a pac
emaker/ICD lead in the right ventricle.



 ATRIA 

The left atrium is mildly dilated. The right atrium is mildly dilated. A pacemaker/ICD is seen in the
 right atrium consistent with history. The interatrial septum is intact with no evidence for an atria
l septal defect or patent foramen ovale as noted on 2-D or Doppler imaging.



 AORTIC VALVE 

The aortic valve is calcified but opens well. Doppler and Color Flow revealed no significant aortic r
egurgitation. There is no significant aortic valvular stenosis.



 MITRAL VALVE 

The mitral valve is normal in structure and function. There is no evidence of mitral valve prolapse. 
There is no mitral valve stenosis. Doppler and Color-flow revealed trace mitral regurgitation.



 TRICUSPID VALVE 

The tricuspid valve is normal in structure and function. Doppler and Color Flow revealed mild tricusp
id regurgitation. There is mild pulmonary hypertension. The PA pressure was estimated at 35 mmHg. The
re is no tricuspid valve stenosis.



 PULMONIC VALVE 

The pulmonic valve is not well visualized. Doppler and Color Flow revealed trace pulmonic valvular re
gurgitation. There is no pulmonic valvular stenosis.



 GREAT VESSELS 

The aortic root is normal in size. The ascending aorta is mildly dilated at 3.7 cm. The IVC was not v
isualized.



 PERICARDIAL EFFUSION 

There is no evidence of significant pericardial effusion.



Critical Notification

Critical Value: No



<Conclusion>

Left ventricle systolic function is severely impaired.

The Ejection Fraction is 20-25%.

Transmitral Doppler flow pattern is Grade I-abnormal relaxation pattern.

There is a pacemaker/ICD lead in the right atrium and right ventricle.

Trace mitral regurgitation.

Mild tricuspid regurgitation.

The PA pressure was estimated at 35 mmHg.

There is no evidence of significant pericardial effusion.



Signed by : Gadiel Means, 

Electronically Approved : 09/18/2019 09:57:11

## 2019-09-18 NOTE — PDOC
Provider Note


Provider Note


history and physical dictated  #  [id number]











COTY FIORE MD           Sep 18, 2019 10:39

## 2019-09-18 NOTE — RAD
Exam performed: Nuclear medicine hepatobiliary scan. 

 

History: Abdominal pain

 

Comparison: None available

 

FINDINGS:

 

Following intravenous administration of 5.5 mCi of Choletec tagged with 

Tc, sequential gamma camera images of the right upper quadrant of the 

abdomen were obtained. There is prompt accumulation of radionuclide in the

liver which appears to be unremarkable  Prompt accumulation in the central

intrahepatic biliary radicals, gallbladder, common bile duct and small 

bowel is noted.

 

Patient was also infused with 1.4mcg of CCK and gallbladder ejection 

fraction was calculated which measures 92%.

 

Impression: 

 

1.Normal nuclear hepatobiliary scan with gallbladder ejection fraction 

measuring 92%.

 

Electronically signed by: José Luis Julio MD (9/18/2019 4:52 PM) [facility]

## 2019-09-18 NOTE — RAD
Examination: ABDOMEN COMPLETE

 

History: Right upper quadrant pain

 

Comparison/Correlation: None

 

Findings: Upper abdominal ultrasound exam was performed. Liver measures 

14.3 cm longitudinal. Mild fatty infiltration of liver noted. Common bile 

duct measures 0.7 cm diameter. No biliary dilatation. Proximal pancreas is

unremarkable. Distal pancreas is obscured by bowel gas. Portal venous flow

is unremarkable.

 

Right kidney measures 10.5 cm x 5.5 cm x 5.3 cm. Left kidney measures 10.2

cm x 5.1 cm x 4.7 cm. No hydronephrosis or nephrolithiasis.

 

Gallbladder is unremarkable with no cholelithiasis.

 

Spleen measures 9.1 cm longitudinal.

 

Abdominal aortic diameter of up to 1.9 cm present.

 

Visualized proximal aorta is unremarkable. Inferior vena cava is 

unremarkable. No upper abdominal ascites.

 

Impression:

Mild fatty infiltration of liver. Unremarkable exam otherwise.

 

Electronically signed by: Kuldeep Beltran MD (9/18/2019 5:13 PM) [facility]

## 2019-09-18 NOTE — HP
ADMIT DATE:  09/18/2019



LOCATION:  Room 414.



HISTORY OF PRESENT ILLNESS:  The patient is a 74-year-old white male with

history of coronary artery disease, ischemic cardiomyopathy and hyperlipidemia,

notes a 2-week history of abdominal pain in the epigastric and mid abdomen area,

radiates to the back at times and severe at times.  Without any fever, nausea,

vomiting or blood in the stool.  He notes that eating makes it worse, especially

after lunchtime, but his appetite has been okay and denies any weight loss.  He

was seen in the office yesterday.  He had significant abdominal tenderness in

epigastric area in the left lower quadrant, prompting admission to the hospital.

 He had a CAT scan of the abdomen and pelvis done, which showed no evidence of a

bowel obstruction and also showed no evidence of acute diverticulitis.  In

addition, he had some mild full thickening of the duodenum and proximal jejunum,

possibly secondary to enteritis.  His appendix was negative.  He had a 1-cm

hypervascular lesion in the pancreas uncinate.  A possibility of an islet cell

tumor of the pancreas or an aneurysm was mentioned by the radiologist.  He also

has a 3-cm fatty umbilical hernia noted.  He says he feels a little better this

morning.  He is therefore admitted for further evaluation of his abdominal pain.



ALLERGIES:  INCLUDE RAMIPRIL AND THIMEROSAL.



MEDICATIONS:  Prior to admission include still working on aspirin 81 mg every

day, atorvastatin 20 mg every day, Entresto 49-51 mg 1 b.i.d., Flomax 0.4 mg

b.i.d.  He is on metoprolol succinate 25 mg every day, multivitamin every day,

Plavix 75 mg every day, Protonix 40 mg every day and spironolactone 25 mg every

day.



PAST HISTORY:  Significant for coronary artery disease and hyperlipidemia,

ischemic cardiomyopathy.  He has a small hiatal hernia.  He has had hypertension

in the past, osteoarthritis, benign prostatic hypertrophy.  He had a PTCA and

stent x 2 through the LAD in 2006.  He has an AICD placed in 2008 and right

shoulder arthroscopic surgery in 2008.  He had an EGD and colonoscopy in 2015

and the colonoscopy showed diverticulosis.



FAMILY HISTORY:  Father had COPD and mother had myocardial infarction and lung

cancer.



SOCIAL HISTORY:  He does not drink alcohol nor does he smoke cigarettes.  He is

.



REVIEW OF SYSTEMS:

GENERAL:  He denies any fever, chills or sweats in the last 3 days.

CARDIOVASCULAR:  No chest pain.

PULMONARY:  No cough or shortness of breath.

GASTROINTESTINAL:  Abdominal pain.

ENDOCRINE:  No diabetes mellitus.

SKIN:  No rashes.

The rest of systems reviewed are negative except as stated in the history of

present illness.



PHYSICAL EXAMINATION:

VITAL SIGNS:  Temperature is 99 degrees, pulse 74, respiratory rate 14, blood

pressure 100/61, oxygen saturation 98% on room air.

HEENT:  Eyes:  Gaze is conjugate.  Mouth:  Tongue is midline.

NECK:  There is no cervical lymphadenopathy or thyroid enlargement.

HEART:  Reveals an S1, S2.  There is no S3 or murmur.

LUNGS:  Clear.

ABDOMEN:  Soft, bowel sounds positive.  He does have tenderness in the

epigastric area, but no further tenderness in the left lower quadrant.

EXTREMITIES:  Lower extremities without edema.

SKIN:  No rashes.

NEUROLOGIC:  Revealed no facial weakness or focal weakness in arms or legs.



An EKG was ordered, but I do not see the report on the chart.  He had a chest

x-ray, which showed a left basilar subsegmental atelectasis and an AICD was

noted.  CAT scan of the abdomen and pelvis is as stated above.



Review of his laboratory tests:  His white count is 4.3, hemoglobin 14.4 with a

platelet count of 138,000, 67 polys and 20 lymphocytes.  His sodium is 143,

potassium 3.9, chloride 109, total CO2 is 25, BUN 14, creatinine 1.1.  Liver

function tests were normal.  Sugar was 84.  Amylase and lipase were normal.



ASSESSMENT:

1.  Abdominal pain, which is now localized to the epigastric area.

2.  Some thickening of the duodenal and jejunal folds on a CAT scan of the

abdomen and pelvis.

3.  A 1-cm hypervascular lesion in the uncinate of the pancreas.

4.  Coronary artery disease.

5.  Ischemic cardiomyopathy.

6.  Hyperlipidemia.



PLAN:  At this time is to consult Dr. Talamantes, who has already seen the patient

and also Dr. Green for GI and Dr. Means for Cardiology.  He did have an

echocardiogram done, which showed a left ventricular ejection fraction 20-25%;

the previous one was 30% in the past.  We will advance him to a clear liquid

diet, discontinue the IV fluids with his cardiomyopathy and we will get an

ultrasound of his abdomen to look at his gallbladder.  Also, consider _____

scan.  I believe he takes Flomax twice a day at home, but his blood pressure is

around 100, so we will continue to once a day instead of twice a day, continue

the spironolactone and the Entresto for his cardiomyopathy and also continue

metoprolol for that.  Continue the aspirin.  The Plavix is on hold for the

moment in case he should need surgery, but we can defer that and also continue

with Protonix and analgesics as needed.

 



______________________________

COTY FIORE MD



DR:  KENYATTA/saima  JOB#:  [id number] / [id number]

DD:  09/18/2019 10:37  DT:  09/18/2019 11:25

## 2019-09-18 NOTE — NUR
RN paged Dr. Nova @ 2660 for the patients BP of 86/52 HR 68, answering service called RN 
back and stated that Dr. Arana is on call and was given his home number. RN called Sumit 
at his home, no orders received and was told to contact Avtar at his home. Nursing 
supervisor was paged for MD's home number but the only number on file is the answering 
service. Patients BP was rechecked at that time 97/57 BP 58. RN will continue to monitor 
patient closely.

## 2019-09-19 VITALS — SYSTOLIC BLOOD PRESSURE: 104 MMHG | DIASTOLIC BLOOD PRESSURE: 66 MMHG

## 2019-09-19 VITALS — DIASTOLIC BLOOD PRESSURE: 58 MMHG | SYSTOLIC BLOOD PRESSURE: 94 MMHG

## 2019-09-19 VITALS — DIASTOLIC BLOOD PRESSURE: 54 MMHG | SYSTOLIC BLOOD PRESSURE: 103 MMHG

## 2019-09-19 VITALS — DIASTOLIC BLOOD PRESSURE: 63 MMHG | SYSTOLIC BLOOD PRESSURE: 105 MMHG

## 2019-09-19 LAB
ANION GAP SERPL CALC-SCNC: 11 MMOL/L (ref 6–14)
BASOPHILS # BLD AUTO: 0 X10^3/UL (ref 0–0.2)
BASOPHILS NFR BLD: 1 % (ref 0–3)
BUN SERPL-MCNC: 15 MG/DL (ref 8–26)
CALCIUM SERPL-MCNC: 8.5 MG/DL (ref 8.5–10.1)
CHLORIDE SERPL-SCNC: 109 MMOL/L (ref 98–107)
CO2 SERPL-SCNC: 22 MMOL/L (ref 21–32)
CREAT SERPL-MCNC: 1 MG/DL (ref 0.7–1.3)
EOSINOPHIL NFR BLD: 0.1 X10^3/UL (ref 0–0.7)
EOSINOPHIL NFR BLD: 2 % (ref 0–3)
ERYTHROCYTE [DISTWIDTH] IN BLOOD BY AUTOMATED COUNT: 12.6 % (ref 11.5–14.5)
GFR SERPLBLD BASED ON 1.73 SQ M-ARVRAT: 73 ML/MIN
GLUCOSE SERPL-MCNC: 81 MG/DL (ref 70–99)
HCT VFR BLD CALC: 42.3 % (ref 39–53)
HGB BLD-MCNC: 14.3 G/DL (ref 13–17.5)
LYMPHOCYTES # BLD: 0.9 X10^3/UL (ref 1–4.8)
LYMPHOCYTES NFR BLD AUTO: 19 % (ref 24–48)
MCH RBC QN AUTO: 32 PG (ref 25–35)
MCHC RBC AUTO-ENTMCNC: 34 G/DL (ref 31–37)
MCV RBC AUTO: 95 FL (ref 79–100)
MONO #: 0.4 X10^3/UL (ref 0–1.1)
MONOCYTES NFR BLD: 10 % (ref 0–9)
NEUT #: 3.2 X10^3/UL (ref 1.8–7.7)
NEUTROPHILS NFR BLD AUTO: 69 % (ref 31–73)
PLATELET # BLD AUTO: 133 X10^3/UL (ref 140–400)
POTASSIUM SERPL-SCNC: 3.9 MMOL/L (ref 3.5–5.1)
RBC # BLD AUTO: 4.47 X10^6/UL (ref 4.3–5.7)
SODIUM SERPL-SCNC: 142 MMOL/L (ref 136–145)
WBC # BLD AUTO: 4.6 X10^3/UL (ref 4–11)

## 2019-09-19 RX ADMIN — PANTOPRAZOLE SODIUM SCH MG: 40 TABLET, DELAYED RELEASE ORAL at 06:16

## 2019-09-19 RX ADMIN — METOPROLOL SUCCINATE SCH MG: 25 TABLET, EXTENDED RELEASE ORAL at 09:00

## 2019-09-19 RX ADMIN — SPIRONOLACTONE SCH MG: 25 TABLET ORAL at 08:58

## 2019-09-19 RX ADMIN — TAMSULOSIN HYDROCHLORIDE SCH MG: 0.4 CAPSULE ORAL at 08:58

## 2019-09-19 RX ADMIN — SACUBITRIL AND VALSARTAN SCH TAB: 49; 51 TABLET, FILM COATED ORAL at 08:59

## 2019-09-19 RX ADMIN — ASPIRIN 81 MG SCH MG: 81 TABLET ORAL at 08:58

## 2019-09-19 NOTE — PDOC
Provider Note


Provider Note


discharge summary dictated  #  [id number]











COTY FIORE MD           Sep 19, 2019 13:55

## 2019-09-19 NOTE — PDOC
PROGRESS NOTES


Subjective


Subjective


feels better. eats solid food. denies abdominal pain. abdominal ultrasound and 

hipida scan negative. lab reviewed.





Objective


Objective





Vital Signs








  Date Time  Temp Pulse Resp B/P (MAP) Pulse Ox O2 Delivery O2 Flow Rate FiO2


 


9/19/19 11:00 98.3 64 14 103/54 (70) 95 Room Air  





 98.3       














Intake and Output 


 


 9/19/19





 07:00


 


Intake Total 910 ml


 


Output Total 275 ml


 


Balance 635 ml


 


 


 


Intake Oral 910 ml


 


Output Urine Total 275 ml


 


# Voids 5











Physical Exam


Abdomen:  Soft, Other (mild epigastric tenderness)


Heart:  Regular rate, Normal S1, Normal S2


Extremities:  No edema


General:  Alert


HEENT:  Atraumatic


Lungs:  Clear to auscultation


Neuro:  Normal speech


Psych/Mental Status:  Mental status NL


Skin:  No rashes





Assessment


Assessment


1.  Abdominal pain, which is now localized to the epigastric area. improved.


2.  Some thickening of the duodenal and jejunal folds on a CAT scan of the


abdomen and pelvis.


3.  A 1-cm hypervascular lesion in the uncinate of the pancreas.


4.  Coronary artery disease.


5.  Ischemic cardiomyopathy.


6.  Hyperlipidemia.





Plan


Plan of Care


dismiss today





Comment


Review of Relevant


I have reviewed the following items jalen (where applicable) has been applied.


Labs





Laboratory Tests








Test


 9/17/19


19:15 9/18/19


05:40 9/19/19


03:20


 


White Blood Count


 4.8 x10^3/uL


(4.0-11.0) 4.2 x10^3/uL


(4.0-11.0) 4.6 x10^3/uL


(4.0-11.0)


 


Red Blood Count


 4.62 x10^6/uL


(4.30-5.70) 4.49 x10^6/uL


(4.30-5.70) 4.47 x10^6/uL


(4.30-5.70)


 


Hemoglobin


 14.9 g/dL


(13.0-17.5) 14.4 g/dL


(13.0-17.5) 14.3 g/dL


(13.0-17.5)


 


Hematocrit


 43.4 %


(39.0-53.0) 42.4 %


(39.0-53.0) 42.3 %


(39.0-53.0)


 


Mean Corpuscular Volume 94 fL ()  94 fL ()  95 fL () 


 


Mean Corpuscular Hemoglobin 32 pg (25-35)  32 pg (25-35)  32 pg (25-35) 


 


Mean Corpuscular Hemoglobin


Concent 34 g/dL


(31-37) 34 g/dL


(31-37) 34 g/dL


(31-37)


 


Red Cell Distribution Width


 12.4 %


(11.5-14.5) 12.7 %


(11.5-14.5) 12.6 %


(11.5-14.5)


 


Platelet Count


 137 x10^3/uL


(140-400) 138 x10^3/uL


(140-400) 133 x10^3/uL


(140-400)


 


Neutrophils (%) (Auto) 64 % (31-73)  67 % (31-73)  69 % (31-73) 


 


Lymphocytes (%) (Auto) 23 % (24-48)  20 % (24-48)  19 % (24-48) 


 


Monocytes (%) (Auto) 10 % (0-9)  11 % (0-9)  10 % (0-9) 


 


Eosinophils (%) (Auto) 2 % (0-3)  2 % (0-3)  2 % (0-3) 


 


Basophils (%) (Auto) 1 % (0-3)  1 % (0-3)  1 % (0-3) 


 


Neutrophils # (Auto)


 3.0 x10^3/uL


(1.8-7.7) 2.8 x10^3/uL


(1.8-7.7) 3.2 x10^3/uL


(1.8-7.7)


 


Lymphocytes # (Auto)


 1.1 x10^3/uL


(1.0-4.8) 0.9 x10^3/uL


(1.0-4.8) 0.9 x10^3/uL


(1.0-4.8)


 


Monocytes # (Auto)


 0.5 x10^3/uL


(0.0-1.1) 0.5 x10^3/uL


(0.0-1.1) 0.4 x10^3/uL


(0.0-1.1)


 


Eosinophils # (Auto)


 0.1 x10^3/uL


(0.0-0.7) 0.1 x10^3/uL


(0.0-0.7) 0.1 x10^3/uL


(0.0-0.7)


 


Basophils # (Auto)


 0.0 x10^3/uL


(0.0-0.2) 0.0 x10^3/uL


(0.0-0.2) 0.0 x10^3/uL


(0.0-0.2)


 


Sodium Level


 142 mmol/L


(136-145) 143 mmol/L


(136-145) 142 mmol/L


(136-145)


 


Potassium Level


 3.9 mmol/L


(3.5-5.1) 3.9 mmol/L


(3.5-5.1) 3.9 mmol/L


(3.5-5.1)


 


Chloride Level


 108 mmol/L


() 109 mmol/L


() 109 mmol/L


()


 


Carbon Dioxide Level


 25 mmol/L


(21-32) 25 mmol/L


(21-32) 22 mmol/L


(21-32)


 


Anion Gap 9 (6-14)  9 (6-14)  11 (6-14) 


 


Blood Urea Nitrogen


 17 mg/dL


(8-26) 14 mg/dL


(8-26) 15 mg/dL


(8-26)


 


Creatinine


 1.1 mg/dL


(0.7-1.3) 1.1 mg/dL


(0.7-1.3) 1.0 mg/dL


(0.7-1.3)


 


Estimated GFR


(Cockcroft-Gault) 65.4 


 65.4 


 73.0 





 


BUN/Creatinine Ratio 15 (6-20)   


 


Glucose Level


 84 mg/dL


(70-99) 95 mg/dL


(70-99) 81 mg/dL


(70-99)


 


Calcium Level


 8.7 mg/dL


(8.5-10.1) 8.7 mg/dL


(8.5-10.1) 8.5 mg/dL


(8.5-10.1)


 


Total Bilirubin


 0.7 mg/dL


(0.2-1.0) 


 





 


Aspartate Amino Transf


(AST/SGOT) 19 U/L (15-37) 


 


 





 


Alanine Aminotransferase


(ALT/SGPT) 22 U/L (16-63) 


 


 





 


Alkaline Phosphatase


 65 U/L


() 


 





 


Total Protein


 6.7 g/dL


(6.4-8.2) 


 





 


Albumin


 3.7 g/dL


(3.4-5.0) 


 





 


Albumin/Globulin Ratio 1.2 (1.0-1.7)   


 


Amylase Level


 50 U/L


() 


 





 


Lipase


 140 U/L


() 


 











Laboratory Tests








Test


 9/19/19


03:20


 


White Blood Count


 4.6 x10^3/uL


(4.0-11.0)


 


Red Blood Count


 4.47 x10^6/uL


(4.30-5.70)


 


Hemoglobin


 14.3 g/dL


(13.0-17.5)


 


Hematocrit


 42.3 %


(39.0-53.0)


 


Mean Corpuscular Volume 95 fL () 


 


Mean Corpuscular Hemoglobin 32 pg (25-35) 


 


Mean Corpuscular Hemoglobin


Concent 34 g/dL


(31-37)


 


Red Cell Distribution Width


 12.6 %


(11.5-14.5)


 


Platelet Count


 133 x10^3/uL


(140-400)


 


Neutrophils (%) (Auto) 69 % (31-73) 


 


Lymphocytes (%) (Auto) 19 % (24-48) 


 


Monocytes (%) (Auto) 10 % (0-9) 


 


Eosinophils (%) (Auto) 2 % (0-3) 


 


Basophils (%) (Auto) 1 % (0-3) 


 


Neutrophils # (Auto)


 3.2 x10^3/uL


(1.8-7.7)


 


Lymphocytes # (Auto)


 0.9 x10^3/uL


(1.0-4.8)


 


Monocytes # (Auto)


 0.4 x10^3/uL


(0.0-1.1)


 


Eosinophils # (Auto)


 0.1 x10^3/uL


(0.0-0.7)


 


Basophils # (Auto)


 0.0 x10^3/uL


(0.0-0.2)


 


Sodium Level


 142 mmol/L


(136-145)


 


Potassium Level


 3.9 mmol/L


(3.5-5.1)


 


Chloride Level


 109 mmol/L


()


 


Carbon Dioxide Level


 22 mmol/L


(21-32)


 


Anion Gap 11 (6-14) 


 


Blood Urea Nitrogen


 15 mg/dL


(8-26)


 


Creatinine


 1.0 mg/dL


(0.7-1.3)


 


Estimated GFR


(Cockcroft-Gault) 73.0 





 


Glucose Level


 81 mg/dL


(70-99)


 


Calcium Level


 8.5 mg/dL


(8.5-10.1)








Medications





Current Medications


Potassium Chloride/Dextrose/ Sod Cl 1,000 ml @  60 mls/hr [id number] IV  Last 

administered on 9/17/19at 19:47;  Start 9/17/19 at 18:15;  Stop 9/18/19 at 

10:28;  Status DC


Morphine Sulfate (Morphine Sulfate) 1 mg PRN Q4HRS  PRN IV PAIN;  Start 9/17/19 

at 18:15


Acetaminophen (Tylenol) 325 mg PRN Q6HRS  PRN PO MILD PAIN / TEMP;  Start 

9/17/19 at 18:15


Metronidazole 100 ml @  100 mls/hr Q8HRS IV  Last administered on 9/18/19at 

05:50;  Start 9/17/19 at 22:00;  Stop 9/18/19 at 07:19;  Status DC


Levofloxacin/ Dextrose 100 ml @  100 mls/hr Q24H IV  Last administered on 9 /17/19at 19:47;  Start 9/17/19 at 19:00;  Stop 9/18/19 at 07:19;  Status DC


Aspirin (Children'S Aspirin) 81 mg DAILY PO  Last administered on 9/19/19at 

09:00;  Start 9/18/19 at 09:00


Atorvastatin Calcium (Lipitor) 20 mg QHS PO  Last administered on 9/18/19at 

21:18;  Start 9/17/19 at 21:00


Metoprolol Succinate (Toprol Xl) 25 mg DAILY PO  Last administered on 9/19/19at 

09:00;  Start 9/18/19 at 09:00


Pantoprazole Sodium (Protonix) 40 mg DAILYAC PO  Last administered on 9/19/19at 

06:16;  Start 9/18/19 at 07:30


Sacubitril/ Valsartan (Entresto 49 Mg-51 Mg) 1 tab BID PO  Last administered on 

9/19/19at 09:00;  Start 9/17/19 at 21:00


Tamsulosin HCl (Flomax) 0.4 mg DAILY PO  Last administered on 9/19/19at 09:00;  

Start 9/18/19 at 09:00


Spironolactone (Aldactone) 25 mg DAILY PO  Last administered on 9/19/19at 09:00;

 Start 9/18/19 at 09:00


Iohexol (Omnipaque 300 Mg/ml) 75 ml 1X  ONCE IV ;  Start 9/17/19 at 23:30;  Stop

9/17/19 at 23:31;  Status DC


Info (CONTRAST GIVEN -- Rx MONITORING) 1 each PRN DAILY  PRN MC SEE COMMENTS;  

Start 9/17/19 at 23:15;  Stop 9/19/19 at 23:14


Acetaminophen/ Hydrocodone Bitart (Lortab 5/325) 1 tab PRN Q4HRS  PRN PO MO

DERATE PAIN;  Start 9/18/19 at 10:30


Sincalide 1.45 mcg/Sodium Chloride 30 ml @  120 mls/hr 1X  ONCE IV  Last 

administered on 9/18/19at 15:01;  Start 9/18/19 at 14:30;  Stop 9/18/19 at 

14:44;  Status DC





Active Scripts


Active


Reported


Spironolactone 25 Mg Tablet 1 Tab PO DAILY


Entresto 49 mg-51 mg Tablet (Sacubitril/Valsartan) 1 Each Tablet 1 Each PO BID


NITROGLYCERIN SubLingual (Nitroglycerin) 0.4 Mg Tab.subl 1 Tab SL UD


Pantoprazole Sodium  ** (Pantoprazole Sodium) 40 Mg Tablet.dr 40 Mg PO DAILY


Toprol Xl (Metoprolol Succinate) 25 Mg Tab.er.24h 25 Mg PO DAILY


Flomax (Tamsulosin Hcl) 0.4 Mg Cap.er.24h 0.4 Mg PO DAILY


Clopidogrel (Clopidogrel Bisulfate) 75 Mg Tablet 75 Mg PO DAILY


Aspirin 81 Mg Tab.chew 81 Mg PO DAILY


Atorvastatin Calcium 40 Mg Tablet 40 Mg PO HS


Losartan Potassium ** (Losartan Potassium) 25 Mg Tablet 25 Mg PO DAILY


Vitals/I & O





Vital Sign - Last 24 Hours








 9/18/19 9/18/19 9/18/19 9/18/19





 15:00 19:00 19:35 21:18


 


Temp 98.3 97.9  





 98.3 97.9  


 


Pulse 69 77  77


 


Resp 14 18  


 


B/P (MAP) 109/66 (80) 105/68 (80)  105/68


 


Pulse Ox 97 95  


 


O2 Delivery Room Air Room Air Room Air 


 


    





    





 9/18/19 9/19/19 9/19/19 9/19/19





 23:00 02:32 07:00 08:00


 


Temp 97.7 97.9 97.6 





 97.7 97.9 97.6 


 


Pulse 68 65 62 


 


Resp 18 18 14 


 


B/P (MAP) 86/52 (63) 94/58 (70) 105/63 (77) 


 


Pulse Ox 95 93 95 


 


O2 Delivery Room Air Room Air Room Air Room Air


 


    





    





 9/19/19 9/19/19 9/19/19 





 09:00 09:00 11:00 


 


Temp   98.3 





   98.3 


 


Pulse 62 62 64 


 


Resp   14 


 


B/P (MAP) 105/63 105/63 103/54 (70) 


 


Pulse Ox   95 


 


O2 Delivery   Room Air 














Intake and Output   


 


 9/18/19 9/18/19 9/19/19





 15:00 23:00 07:00


 


Intake Total 210 ml 360 ml 340 ml


 


Output Total   275 ml


 


Balance 210 ml 360 ml 65 ml

















COTY FIORE MD           Sep 19, 2019 13:45

## 2019-09-19 NOTE — NUR
Discharge Note:



JENNIFER ISRAEL [place]



Discharge instructions and discharge home medications reviewed with Patient and a copy 
given. All questions have been answered and understanding verbalized. 



The following instructions and handouts were given: Discharge Instructions 



Discontinued lines and drains: PIV removed, Catheter intact.



Patient discharged to Home with Self-Care via Private Vehicle

## 2019-09-19 NOTE — PDOC
YVES RIVERS APRN 9/19/19 0934:


SURGICAL PROGRESS NOTE


Subjective


mild RUQ pain


tolerating diet, does not seem to aggravate pain


no n/v


Vital Signs





Vital Signs








  Date Time  Temp Pulse Resp B/P (MAP) Pulse Ox O2 Delivery O2 Flow Rate FiO2


 


9/19/19 09:00  62  105/63    


 


9/19/19 07:00 97.6  14  95 Room Air  





 97.6       








I&O











Intake and Output 


 


 9/19/19





 07:00


 


Intake Total 910 ml


 


Output Total 275 ml


 


Balance 635 ml


 


 


 


Intake Oral 910 ml


 


Output Urine Total 275 ml


 


# Voids 5








General:  Alert, Oriented X3, Cooperative, No acute distress


Abdomen:  Soft, Other (mild ttp ruq)


Labs





Laboratory Tests








Test


 9/17/19


19:15 9/18/19


05:40 9/19/19


03:20


 


White Blood Count


 4.8 x10^3/uL


(4.0-11.0) 4.2 x10^3/uL


(4.0-11.0) 4.6 x10^3/uL


(4.0-11.0)


 


Red Blood Count


 4.62 x10^6/uL


(4.30-5.70) 4.49 x10^6/uL


(4.30-5.70) 4.47 x10^6/uL


(4.30-5.70)


 


Hemoglobin


 14.9 g/dL


(13.0-17.5) 14.4 g/dL


(13.0-17.5) 14.3 g/dL


(13.0-17.5)


 


Hematocrit


 43.4 %


(39.0-53.0) 42.4 %


(39.0-53.0) 42.3 %


(39.0-53.0)


 


Mean Corpuscular Volume 94 fL ()  94 fL ()  95 fL () 


 


Mean Corpuscular Hemoglobin 32 pg (25-35)  32 pg (25-35)  32 pg (25-35) 


 


Mean Corpuscular Hemoglobin


Concent 34 g/dL


(31-37) 34 g/dL


(31-37) 34 g/dL


(31-37)


 


Red Cell Distribution Width


 12.4 %


(11.5-14.5) 12.7 %


(11.5-14.5) 12.6 %


(11.5-14.5)


 


Platelet Count


 137 x10^3/uL


(140-400) 138 x10^3/uL


(140-400) 133 x10^3/uL


(140-400)


 


Neutrophils (%) (Auto) 64 % (31-73)  67 % (31-73)  69 % (31-73) 


 


Lymphocytes (%) (Auto) 23 % (24-48)  20 % (24-48)  19 % (24-48) 


 


Monocytes (%) (Auto) 10 % (0-9)  11 % (0-9)  10 % (0-9) 


 


Eosinophils (%) (Auto) 2 % (0-3)  2 % (0-3)  2 % (0-3) 


 


Basophils (%) (Auto) 1 % (0-3)  1 % (0-3)  1 % (0-3) 


 


Neutrophils # (Auto)


 3.0 x10^3/uL


(1.8-7.7) 2.8 x10^3/uL


(1.8-7.7) 3.2 x10^3/uL


(1.8-7.7)


 


Lymphocytes # (Auto)


 1.1 x10^3/uL


(1.0-4.8) 0.9 x10^3/uL


(1.0-4.8) 0.9 x10^3/uL


(1.0-4.8)


 


Monocytes # (Auto)


 0.5 x10^3/uL


(0.0-1.1) 0.5 x10^3/uL


(0.0-1.1) 0.4 x10^3/uL


(0.0-1.1)


 


Eosinophils # (Auto)


 0.1 x10^3/uL


(0.0-0.7) 0.1 x10^3/uL


(0.0-0.7) 0.1 x10^3/uL


(0.0-0.7)


 


Basophils # (Auto)


 0.0 x10^3/uL


(0.0-0.2) 0.0 x10^3/uL


(0.0-0.2) 0.0 x10^3/uL


(0.0-0.2)


 


Sodium Level


 142 mmol/L


(136-145) 143 mmol/L


(136-145) 142 mmol/L


(136-145)


 


Potassium Level


 3.9 mmol/L


(3.5-5.1) 3.9 mmol/L


(3.5-5.1) 3.9 mmol/L


(3.5-5.1)


 


Chloride Level


 108 mmol/L


() 109 mmol/L


() 109 mmol/L


()


 


Carbon Dioxide Level


 25 mmol/L


(21-32) 25 mmol/L


(21-32) 22 mmol/L


(21-32)


 


Anion Gap 9 (6-14)  9 (6-14)  11 (6-14) 


 


Blood Urea Nitrogen


 17 mg/dL


(8-26) 14 mg/dL


(8-26) 15 mg/dL


(8-26)


 


Creatinine


 1.1 mg/dL


(0.7-1.3) 1.1 mg/dL


(0.7-1.3) 1.0 mg/dL


(0.7-1.3)


 


Estimated GFR


(Cockcroft-Gault) 65.4 


 65.4 


 73.0 





 


BUN/Creatinine Ratio 15 (6-20)   


 


Glucose Level


 84 mg/dL


(70-99) 95 mg/dL


(70-99) 81 mg/dL


(70-99)


 


Calcium Level


 8.7 mg/dL


(8.5-10.1) 8.7 mg/dL


(8.5-10.1) 8.5 mg/dL


(8.5-10.1)


 


Total Bilirubin


 0.7 mg/dL


(0.2-1.0) 


 





 


Aspartate Amino Transf


(AST/SGOT) 19 U/L (15-37) 


 


 





 


Alanine Aminotransferase


(ALT/SGPT) 22 U/L (16-63) 


 


 





 


Alkaline Phosphatase


 65 U/L


() 


 





 


Total Protein


 6.7 g/dL


(6.4-8.2) 


 





 


Albumin


 3.7 g/dL


(3.4-5.0) 


 





 


Albumin/Globulin Ratio 1.2 (1.0-1.7)   


 


Amylase Level


 50 U/L


() 


 





 


Lipase


 140 U/L


() 


 











Laboratory Tests








Test


 9/19/19


03:20


 


White Blood Count


 4.6 x10^3/uL


(4.0-11.0)


 


Red Blood Count


 4.47 x10^6/uL


(4.30-5.70)


 


Hemoglobin


 14.3 g/dL


(13.0-17.5)


 


Hematocrit


 42.3 %


(39.0-53.0)


 


Mean Corpuscular Volume 95 fL () 


 


Mean Corpuscular Hemoglobin 32 pg (25-35) 


 


Mean Corpuscular Hemoglobin


Concent 34 g/dL


(31-37)


 


Red Cell Distribution Width


 12.6 %


(11.5-14.5)


 


Platelet Count


 133 x10^3/uL


(140-400)


 


Neutrophils (%) (Auto) 69 % (31-73) 


 


Lymphocytes (%) (Auto) 19 % (24-48) 


 


Monocytes (%) (Auto) 10 % (0-9) 


 


Eosinophils (%) (Auto) 2 % (0-3) 


 


Basophils (%) (Auto) 1 % (0-3) 


 


Neutrophils # (Auto)


 3.2 x10^3/uL


(1.8-7.7)


 


Lymphocytes # (Auto)


 0.9 x10^3/uL


(1.0-4.8)


 


Monocytes # (Auto)


 0.4 x10^3/uL


(0.0-1.1)


 


Eosinophils # (Auto)


 0.1 x10^3/uL


(0.0-0.7)


 


Basophils # (Auto)


 0.0 x10^3/uL


(0.0-0.2)


 


Sodium Level


 142 mmol/L


(136-145)


 


Potassium Level


 3.9 mmol/L


(3.5-5.1)


 


Chloride Level


 109 mmol/L


()


 


Carbon Dioxide Level


 22 mmol/L


(21-32)


 


Anion Gap 11 (6-14) 


 


Blood Urea Nitrogen


 15 mg/dL


(8-26)


 


Creatinine


 1.0 mg/dL


(0.7-1.3)


 


Estimated GFR


(Cockcroft-Gault) 73.0 





 


Glucose Level


 81 mg/dL


(70-99)


 


Calcium Level


 8.5 mg/dL


(8.5-10.1)








Assessment/Plan


normal HIDA, EF 92%


will review with Dr Talamantes--pancreatic lesion--could consider outpt EUS, 

biopsy, MRI--defer to medicine and GI


no surgical indications





PARKER TALAMANTES MD 9/19/19 6079:


SURGICAL PROGRESS NOTE


Assessment/Plan


Reviewed, agree with above;  testing not suggestive of gallbladder abnormality; 

pancreatic lesion likely incidental;  would recommend further evaluation (could 

be as outpatient), MRI or endoscopic ultrasound +/- FNA.











YVES RIVERS APRN            Sep 19, 2019 09:34


PARKER TALAMANTES MD             Sep 19, 2019 13:36

## 2019-09-19 NOTE — DISCH
DISCHARGE INSTRUCTIONS


Condition on Discharge


Condition on Discharge:  Stable





Activity After Discharge


Activity Instructions for Disc:  Resume previous activity





Diet after Discharge


Diet after Discharge:  Regular





Contacting the DR. after DC


Call your doctor for:  If your condition worsens





Follow-Up


Follow up with:  dr. fiore next week


Follow Up With:  needs endoscopic ultrasound of pancreatic lesion at [facility] as out

patient











COTY FIORE MD           Sep 19, 2019 13:51

## 2019-09-20 NOTE — DS
DATE OF DISCHARGE:  09/19/2019



CONSULTANTS:  Dr. Talamantes, Dr. Green and Dr. Means.



FINAL DIAGNOSES:

1.  Epigastric abdominal pain, possibly secondary to gastritis.

2.  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

either islet cell tumor or a vascular lesion.

3.  Ischemic cardiomyopathy with left ventricular ejection fraction of 20-25%. 

He has an automatic implantable cardioverter defibrillator.

4.  Coronary artery disease.

5.  Hyperlipidemia.



HOSPITAL COURSE:  The patient is a 74-year-old white male with a history of

coronary artery disease, ischemic cardiomyopathy, hyperlipidemia with a 2-week

history of epigastric abdominal pain and radiates to his back at times, without

any nausea, vomiting, hematemesis or black stools.  Sometimes occurs after lunch

time.  He was seen in the office, had significant epigastric and left lower

quadrant abdominal tenderness and subsequently admitted to the hospital and

started initially on IV Flagyl and Levaquin with a history of diverticulosis

noted on his colonoscopy 4 years ago.  He underwent a CAT scan of the abdomen

and pelvis, which showed some thickening of the duodenum and jejunal falls and a

1 cm lesion in the uncinate of the pancreas, which could be vascular.  The

patient was initially kept n.p.o. and then started on clear liquids and his diet

was advanced and the abdominal pain essentially resolved.  He was eating well. 

He was seen by Dr. Talamantes for General Surgery and Dr. Green for GI.  He had

a PIPIDA scan and ultrasound of the abdomen and showed mild fatty liver. 

Gallbladder was fine and gallbladder functioning normally with ____ scan.  He

was doing fine and it was felt that he needed an outpatient endoscopic

ultrasound of his pancreatic lesion, which was discussed with him and that can

be done as an outpatient at [facility].  He will be

dismissed and he had an echocardiogram which showed left ventricular ejection

fraction 20-25%.  He is already taking metoprolol and Entresto.  He will be

dismissed to home on same medications he was taking prior to admission including

aspirin 81 mg every day; atorvastatin 20 mg every day; Entresto 49/51 mg 1

b.i.d.; Flomax 0.4 mg, actually he takes it b.i.d., now once a day; metoprolol

succinate 25 mg every day; multiple vitamin once a day; Plavix 75 mg every day;

Protonix 40 mg every day; spironolactone 25 mg every day.  He will make an

appointment to see Dr. Nova in the office next week and will schedule an

outpatient endoscopic ultrasound to check his pancreatic lesion.  This was

discussed with him and he is aware that he needs to get this done.

 



______________________________

COTY NOVA MD



DR:  KENYATTA/saima  JOB#:  [id number] / [id number]

DD:  09/19/2019 13:55  DT:  09/19/2019 15:16

## 2024-07-26 NOTE — PDOC2
GI CONSULT


Vitals:


Vitals:





                                   Vital Signs





Allergies:


Coded Allergies:  


     ramipril (Verified  Allergy, Intermediate, 9/18/19)


   


   cough


     thimerosal (Verified  Allergy, Intermediate, 8/17/15)


   


   reddened eyes





A/P:


A/P:


Please see GI consult for this same date.











ELIZABETH KAMARA         Sep 18, 2019 10:07 Paperwork/Yes